# Patient Record
Sex: MALE | Race: WHITE | NOT HISPANIC OR LATINO
[De-identification: names, ages, dates, MRNs, and addresses within clinical notes are randomized per-mention and may not be internally consistent; named-entity substitution may affect disease eponyms.]

---

## 2023-03-06 ENCOUNTER — APPOINTMENT (OUTPATIENT)
Dept: OTOLARYNGOLOGY | Facility: CLINIC | Age: 28
End: 2023-03-06
Payer: COMMERCIAL

## 2023-03-06 ENCOUNTER — NON-APPOINTMENT (OUTPATIENT)
Age: 28
End: 2023-03-06

## 2023-03-06 VITALS
WEIGHT: 141 LBS | HEART RATE: 83 BPM | OXYGEN SATURATION: 98 % | HEIGHT: 62 IN | DIASTOLIC BLOOD PRESSURE: 83 MMHG | BODY MASS INDEX: 25.95 KG/M2 | TEMPERATURE: 97.8 F | SYSTOLIC BLOOD PRESSURE: 158 MMHG

## 2023-03-06 DIAGNOSIS — Z78.9 OTHER SPECIFIED HEALTH STATUS: ICD-10-CM

## 2023-03-06 PROBLEM — Z00.00 ENCOUNTER FOR PREVENTIVE HEALTH EXAMINATION: Status: ACTIVE | Noted: 2023-03-06

## 2023-03-06 PROCEDURE — 31231 NASAL ENDOSCOPY DX: CPT

## 2023-03-06 PROCEDURE — 99204 OFFICE O/P NEW MOD 45 MIN: CPT | Mod: 25

## 2023-03-06 NOTE — HISTORY OF PRESENT ILLNESS
[de-identified] : 3/6/23\par 27M presents with nasal congestion "all his life." Symptoms are constant throughout the day, worse at nighttime.  Reports sinus infections 5-6 x a year (sometimes on ABX). Worse on right side. Symptoms include severe nasal congestion, headaches (forehead) and drainage. Denies facial pressure, changes in sense of taste or smell, drainage or dental pain. For treatments, he's tried Breathe Right strips, sinus rinse, Xclear and Afrin once.

## 2023-03-06 NOTE — PROCEDURE
[FreeTextEntry6] : -\par Nasal Endoscopy Procedure Note\par \par Pre-operative Diagnosis: nasal congestion \par Post-operative Diagnosis: nasal septal deviation \par Anesthesia: Topical\par Procedure: Bilateral nasal endoscopy\par  \par Procedure Details: \par After topical anesthesia and decongestant, the patient was placed in the supine position. The telescope was passed along the left nasal floor to the nasopharynx. It was then passed into the region of the middle meatus, middle turbinate, and the sphenoethmoid region. An identical procedure was performed on the right side. \par  \par Findings: \par Mucosa: 	 normal	\par Nasal septum: deviated bilaterally, anteriorly worse on left and worse on right posteriorly \par Discharge: 	none	\par Turbinates: 	hypertrophied \par Adenoid: 	 normal	\par Posterior choanae: 	normal	\par Eustachian tubes: 	normal	\par Mucous stranding: 	normal 	\par Lesions: 	 Not present	\par  \par Comments: \par Condition: Stable. Patient tolerated procedure well.\par

## 2023-03-06 NOTE — PHYSICAL EXAM
[Nasal Endoscopy Performed] : nasal endoscopy was performed, see procedure section for findings [] : septum deviated bilaterally [Midline] : trachea located in midline position [Normal] : no rashes [de-identified] : + turbinate hypertrophy

## 2023-03-06 NOTE — ASSESSMENT
[FreeTextEntry1] : 27 year old male presents with chronic nasal congestion. On nasal endoscopy, there was evidence of bilateral nasal septal deviation and turbinate hypertrophy. Based on history and exam findings, I am recommending nasal saline irrigation and nasal steroids. I am also recommending a CT Sinus. Follow up in 1 month for repeat eval. If symptoms persist patient would likely need surgical intervention including septoplasty, turbinectomy +/- endoscopic sinus surgery\par \par - nasal saline irrigation and nasal steroids\par - CT Sinus \par - fu 1 month

## 2023-03-14 ENCOUNTER — OUTPATIENT (OUTPATIENT)
Dept: OUTPATIENT SERVICES | Facility: HOSPITAL | Age: 28
LOS: 1 days | End: 2023-03-14
Payer: COMMERCIAL

## 2023-03-14 ENCOUNTER — APPOINTMENT (OUTPATIENT)
Dept: CT IMAGING | Facility: HOSPITAL | Age: 28
End: 2023-03-14
Payer: COMMERCIAL

## 2023-03-14 PROCEDURE — 70486 CT MAXILLOFACIAL W/O DYE: CPT

## 2023-03-14 PROCEDURE — 70486 CT MAXILLOFACIAL W/O DYE: CPT | Mod: 26

## 2023-04-10 ENCOUNTER — APPOINTMENT (OUTPATIENT)
Dept: OTOLARYNGOLOGY | Facility: CLINIC | Age: 28
End: 2023-04-10
Payer: COMMERCIAL

## 2023-04-10 VITALS
BODY MASS INDEX: 25.76 KG/M2 | HEIGHT: 62 IN | TEMPERATURE: 98.2 F | SYSTOLIC BLOOD PRESSURE: 163 MMHG | WEIGHT: 140 LBS | HEART RATE: 68 BPM | DIASTOLIC BLOOD PRESSURE: 78 MMHG

## 2023-04-10 PROCEDURE — 31231 NASAL ENDOSCOPY DX: CPT

## 2023-04-10 PROCEDURE — 99215 OFFICE O/P EST HI 40 MIN: CPT | Mod: 25

## 2023-04-11 NOTE — PROCEDURE
[FreeTextEntry6] : -\par Nasal Endoscopy Procedure Note\par \par Pre-operative Diagnosis: nasal congestion \par Post-operative Diagnosis: nasal septal deviation \par Anesthesia: Topical\par Procedure: Bilateral nasal endoscopy\par  \par Procedure Details: \par After topical anesthesia and decongestant, the patient was placed in the supine position. The telescope was passed along the left nasal floor to the nasopharynx. It was then passed into the region of the middle meatus, middle turbinate, and the sphenoethmoid region. An identical procedure was performed on the right side. \par  \par Findings: \par Mucosa: 	 normal	\par Nasal septum: deviated bilaterally, anteriorly worse on left and worse on right posteriorly \par Discharge: 	none	\par Turbinates: 	hypertrophied \par Adenoid: 	 normal	\par Posterior choanae: 	normal	\par Eustachian tubes: 	normal	\par Mucous stranding: 	normal 	\par Lesions: 	 Not present	\par  \par Comments: \par Condition: Stable. Patient tolerated procedure well.\par  \par

## 2023-04-11 NOTE — PHYSICAL EXAM
[Nasal Endoscopy Performed] : nasal endoscopy was performed, see procedure section for findings [] : septum deviated bilaterally [de-identified] : + Turbinate hypertrophy [Normal] : mucosa is normal [Midline] : trachea located in midline position

## 2023-04-11 NOTE — ASSESSMENT
[FreeTextEntry1] : 27 year old male presents with chronic nasal congestion. On nasal endoscopy, there was evidence of bilateral nasal septal deviation and turbinate hypertrophy. Based on history and exam findings, I am recommending nasal saline irrigation and nasal steroids. I am also recommending a CT Sinus. Follow up in 1 month for repeat eval. If symptoms persist patient would likely need surgical intervention including septoplasty, turbinectomy +/- endoscopic sinus surgery\par \par  4/10/2023:  patient has tried conservative treatment with nasal saline and nasal steroids to no avail.  Exam remains unchanged from previous.  CT sinus completed and consistent with septal deviation, turbinate hypertrophy and sinonasal inflammatory disease.  At this time patient would benefit from septoplasty, turbinectomy and endoscopic sinus surgery.  Risk, benefits and alternatives of surgery were discussed including bleeding, infection, pain, risk of anesthesia, injury to the orbit or skull-base, worsened sense of smell, need for further procedures and possible time off of work.  Patient would like to proceed.\par \par \par –Plan for OR for septoplasty, turbinectomy, endoscopic sinus surgery with navigation\par – Preoperative clearance\par – Continue nasal saline, nasal steroids in the interim.

## 2023-04-11 NOTE — HISTORY OF PRESENT ILLNESS
[de-identified] : 3/6/23\par 27M presents with nasal congestion "all his life." Symptoms are constant throughout the day, worse at nighttime.  Reports sinus infections 5-6 x a year (sometimes on ABX). Worse on right side. Symptoms include severe nasal congestion, headaches (forehead) and drainage. Denies facial pressure, changes in sense of taste or smell, drainage or dental pain. For treatments, he's tried Breathe Right strips, sinus rinse, Xclear and Afrin once. \par - [FreeTextEntry1] :  4/10/2023\par Overall no significant changes in symptoms.  Patient has been continuing to use nasal saline and nasal steroids.  No new ear, nose, throat symptoms.  Did complete a CT sinus and presents to review those results.

## 2023-06-02 ENCOUNTER — RX RENEWAL (OUTPATIENT)
Age: 28
End: 2023-06-02

## 2023-06-02 RX ORDER — FLUTICASONE PROPIONATE 50 UG/1
50 SPRAY, METERED NASAL
Qty: 48 | Refills: 0 | Status: ACTIVE | COMMUNITY
Start: 2023-03-06 | End: 1900-01-01

## 2023-06-09 ENCOUNTER — APPOINTMENT (OUTPATIENT)
Dept: OTOLARYNGOLOGY | Facility: CLINIC | Age: 28
End: 2023-06-09

## 2023-06-14 ENCOUNTER — APPOINTMENT (OUTPATIENT)
Dept: OTOLARYNGOLOGY | Facility: AMBULATORY SURGERY CENTER | Age: 28
End: 2023-06-14

## 2023-06-22 ENCOUNTER — APPOINTMENT (OUTPATIENT)
Dept: OTOLARYNGOLOGY | Facility: CLINIC | Age: 28
End: 2023-06-22

## 2023-07-13 ENCOUNTER — APPOINTMENT (OUTPATIENT)
Dept: OTOLARYNGOLOGY | Facility: CLINIC | Age: 28
End: 2023-07-13

## 2023-07-19 PROBLEM — J34.2 NASAL SEPTAL DEVIATION: Status: ACTIVE | Noted: 2023-03-06

## 2023-07-19 PROBLEM — J32.9 CHRONIC SINUSITIS: Status: ACTIVE | Noted: 2023-03-06

## 2023-07-19 PROBLEM — J34.3 HYPERTROPHY OF BOTH INFERIOR NASAL TURBINATES: Status: ACTIVE | Noted: 2023-03-06

## 2023-07-19 PROBLEM — R09.81 CHRONIC NASAL CONGESTION: Status: ACTIVE | Noted: 2023-03-06

## 2023-07-20 ENCOUNTER — APPOINTMENT (OUTPATIENT)
Dept: OTOLARYNGOLOGY | Facility: CLINIC | Age: 28
End: 2023-07-20

## 2023-07-20 DIAGNOSIS — J34.2 DEVIATED NASAL SEPTUM: ICD-10-CM

## 2023-07-20 DIAGNOSIS — J32.9 CHRONIC SINUSITIS, UNSPECIFIED: ICD-10-CM

## 2023-07-20 DIAGNOSIS — J34.3 HYPERTROPHY OF NASAL TURBINATES: ICD-10-CM

## 2023-07-20 DIAGNOSIS — R09.81 NASAL CONGESTION: ICD-10-CM
